# Patient Record
Sex: FEMALE | Race: WHITE | Employment: OTHER | URBAN - METROPOLITAN AREA
[De-identification: names, ages, dates, MRNs, and addresses within clinical notes are randomized per-mention and may not be internally consistent; named-entity substitution may affect disease eponyms.]

---

## 2018-11-24 ENCOUNTER — APPOINTMENT (OUTPATIENT)
Dept: GENERAL RADIOLOGY | Age: 83
End: 2018-11-24
Attending: FAMILY MEDICINE
Payer: MEDICARE

## 2018-11-24 ENCOUNTER — HOSPITAL ENCOUNTER (OUTPATIENT)
Age: 83
Discharge: HOME | End: 2018-11-24
Attending: FAMILY MEDICINE
Payer: MEDICARE

## 2018-11-24 VITALS
HEIGHT: 65 IN | BODY MASS INDEX: 20.33 KG/M2 | DIASTOLIC BLOOD PRESSURE: 74 MMHG | RESPIRATION RATE: 20 BRPM | OXYGEN SATURATION: 99 % | WEIGHT: 122 LBS | TEMPERATURE: 98 F | HEART RATE: 83 BPM | SYSTOLIC BLOOD PRESSURE: 165 MMHG

## 2018-11-24 DIAGNOSIS — I10 HYPERTENSION, UNSPECIFIED TYPE: Primary | ICD-10-CM

## 2018-11-24 DIAGNOSIS — S52.502A CLOSED FRACTURE OF DISTAL END OF LEFT RADIUS, UNSPECIFIED FRACTURE MORPHOLOGY, INITIAL ENCOUNTER: ICD-10-CM

## 2018-11-24 DIAGNOSIS — S62.617A CLOSED DISPLACED FRACTURE OF PROXIMAL PHALANX OF LEFT LITTLE FINGER, INITIAL ENCOUNTER: ICD-10-CM

## 2018-11-24 PROCEDURE — 73110 X-RAY EXAM OF WRIST: CPT | Performed by: FAMILY MEDICINE

## 2018-11-24 PROCEDURE — 99204 OFFICE O/P NEW MOD 45 MIN: CPT

## 2018-11-24 PROCEDURE — 26720 TREAT FINGER FRACTURE EACH: CPT

## 2018-11-24 PROCEDURE — 73130 X-RAY EXAM OF HAND: CPT | Performed by: FAMILY MEDICINE

## 2018-11-24 RX ORDER — MULTIVIT,IRON,MINERALS/LUTEIN
TABLET ORAL
COMMUNITY

## 2018-11-24 RX ORDER — SIMVASTATIN 20 MG
20 TABLET ORAL NIGHTLY
COMMUNITY

## 2018-11-24 RX ORDER — METOPROLOL SUCCINATE 50 MG/1
50 TABLET, EXTENDED RELEASE ORAL DAILY
COMMUNITY

## 2018-11-24 NOTE — ED INITIAL ASSESSMENT (HPI)
Patient slipped on the stairs yesterday and landed on left 5th digit. Patient was seen in [facility] care and had an Xray wrapped finger in finger splint, Family was not happy how it was wrapped and brought her in for evaluation.

## 2018-11-24 NOTE — ED PROVIDER NOTES
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   4. Demineralization.     Dictated by (CST): Donal Rice MD on 11/24/2018 at 17:18       Approved by (CST): Donal Rice MD on 11/24/2018 at 17:20                    Narrative:    Debbie Dominguez osteoarthritis with milder interphalangeal joint osteoarthritis. Demineralization. No acute fracture   or dislocation is apparent. There is no evidence of significant arthropathy. The intercarpal distances are preserved. The carpal arcs are well aligned.

## 2018-11-26 ENCOUNTER — HOSPITAL ENCOUNTER (OUTPATIENT)
Dept: GENERAL RADIOLOGY | Facility: HOSPITAL | Age: 83
Discharge: HOME | End: 2018-11-26
Attending: PLASTIC SURGERY
Payer: MEDICARE

## 2018-11-26 DIAGNOSIS — M25.532 PAIN IN LEFT WRIST: ICD-10-CM

## 2018-11-26 PROCEDURE — 73110 X-RAY EXAM OF WRIST: CPT | Performed by: PLASTIC SURGERY
